# Patient Record
Sex: FEMALE | URBAN - METROPOLITAN AREA
[De-identification: names, ages, dates, MRNs, and addresses within clinical notes are randomized per-mention and may not be internally consistent; named-entity substitution may affect disease eponyms.]

---

## 2018-01-09 ENCOUNTER — OFFICE VISIT (OUTPATIENT)
Dept: SURGERY | Facility: CLINIC | Age: 58
End: 2018-01-09

## 2018-01-09 VITALS
WEIGHT: 172 LBS | SYSTOLIC BLOOD PRESSURE: 146 MMHG | BODY MASS INDEX: 26.07 KG/M2 | TEMPERATURE: 98 F | HEART RATE: 79 BPM | HEIGHT: 68 IN | DIASTOLIC BLOOD PRESSURE: 91 MMHG

## 2018-01-09 DIAGNOSIS — N28.89 RENAL MASS, LEFT: Primary | ICD-10-CM

## 2018-01-09 PROCEDURE — 99212 OFFICE O/P EST SF 10 MIN: CPT | Performed by: UROLOGY

## 2018-01-09 PROCEDURE — 99203 OFFICE O/P NEW LOW 30 MIN: CPT | Performed by: UROLOGY

## 2018-01-09 NOTE — PROGRESS NOTES
Jessica Peres is a 65year old female. Reason for Consultation:   Patient presents with:  Kidney Problem: patient presents for renal mass       History provided by pt. Pt comes to see me as a second opinion, referred by herself.       History of P Exposure  none    Smoking History  Never smoker      HISTORY:  Past Medical History:   Diagnosis Date   • Essential hypertension    • Hyperlipidemia       History reviewed. No pertinent surgical history.    Family History   Problem Relation Age of Onset   • affect  Exam appropriate for age; patellar reflexes right patellar reflex not able to be elicited; left normal  Head/Face: normocephalic  Eyes/Vision: no scleral icterus  Neck/Thyroid: neck is supple without adenopathy  Lymphatic: no abnormal cervical, sup in the tail of the pancreas (8/11).     06/2009 MR Pelvis w+w/out contrast (chaparrita) = THERE ARE DEGENERATIVE DISC CHANGES AT L4-5 AND L5-S1 LEVELS.        ASSESSMENT/PLAN:      Renal mass, left  (primary encounter diagnosis)     (N28.89) Renal mass, left  ( however I recommended she consult with her [address] [place] [address] doctors for an opinion on the cause of her October 2017 gross hematuria. RECOMMENDATIONS AND TREATMENT PLAN      1.   I recommend that you make a decision on proceeding with surgery on the left kidney; if

## 2024-09-28 NOTE — PATIENT INSTRUCTIONS
1.  I recommend that you make a decision on proceeding with surgery on the left kidney; if you are uncomfortable with proceeding, then asked to talk to your urological surgeon about the option of active surveillance, though this would be a long-term commit
Yes